# Patient Record
Sex: MALE | Race: WHITE | HISPANIC OR LATINO | URBAN - METROPOLITAN AREA
[De-identification: names, ages, dates, MRNs, and addresses within clinical notes are randomized per-mention and may not be internally consistent; named-entity substitution may affect disease eponyms.]

---

## 2023-02-17 ENCOUNTER — LAB OUTSIDE AN ENCOUNTER (OUTPATIENT)
Dept: URBAN - METROPOLITAN AREA CLINIC 60 | Facility: CLINIC | Age: 48
End: 2023-02-17

## 2023-02-17 ENCOUNTER — OFFICE VISIT (OUTPATIENT)
Dept: URBAN - METROPOLITAN AREA CLINIC 60 | Facility: CLINIC | Age: 48
End: 2023-02-17
Payer: COMMERCIAL

## 2023-02-17 ENCOUNTER — WEB ENCOUNTER (OUTPATIENT)
Dept: URBAN - METROPOLITAN AREA CLINIC 60 | Facility: CLINIC | Age: 48
End: 2023-02-17

## 2023-02-17 VITALS
HEART RATE: 90 BPM | WEIGHT: 234 LBS | SYSTOLIC BLOOD PRESSURE: 120 MMHG | RESPIRATION RATE: 20 BRPM | DIASTOLIC BLOOD PRESSURE: 70 MMHG | OXYGEN SATURATION: 98 % | BODY MASS INDEX: 35.46 KG/M2 | HEIGHT: 68 IN | TEMPERATURE: 98.5 F

## 2023-02-17 DIAGNOSIS — Z12.11 SCREENING FOR MALIGNANT NEOPLASM OF COLON: ICD-10-CM

## 2023-02-17 DIAGNOSIS — R12 HEARTBURN: ICD-10-CM

## 2023-02-17 PROCEDURE — 99204 OFFICE O/P NEW MOD 45 MIN: CPT | Performed by: PHYSICIAN ASSISTANT

## 2023-02-17 RX ORDER — LOSARTAN POTASSIUM 25 MG/1
TAKE ONE TABLET BY MOUTH ONE TIME DAILY MUST CALL DR. FOR APPOINTMENT TABLET, FILM COATED ORAL
Qty: 90 UNSPECIFIED | Refills: 0 | Status: ACTIVE | COMMUNITY

## 2023-02-17 NOTE — HPI-TODAY'S VISIT:
47-year-old [place]-speaking male with hypertension, hyperlipidemia, vitamin D deficiency is referred to the office for baseline screening colonoscopy.   He does speak English as well. Translation was provided by office staff when needed.   He has a long history of heartburn occurring  once a week if he eats greasy or spicy foods and would like EGD to be done along with his colonoscopy. He has no dysphagia, odynophagia. He uses omeprazole 20mg on a demand basis. He has no unintentional weight loss. No prior EGD.   He has no abdominal pain, nausea, vomiting, abdominal pain, constipation, diarrhea, melena, hematochezia.   He has no family history of colon cancer.

## 2023-03-01 ENCOUNTER — TELEPHONE ENCOUNTER (OUTPATIENT)
Dept: URBAN - METROPOLITAN AREA CLINIC 60 | Facility: CLINIC | Age: 48
End: 2023-03-01

## 2023-03-03 ENCOUNTER — OFFICE VISIT (OUTPATIENT)
Dept: URBAN - METROPOLITAN AREA SURGERY CENTER 4 | Facility: SURGERY CENTER | Age: 48
End: 2023-03-03

## 2023-03-03 ENCOUNTER — CLAIMS CREATED FROM THE CLAIM WINDOW (OUTPATIENT)
Dept: URBAN - METROPOLITAN AREA SURGERY CENTER 4 | Facility: SURGERY CENTER | Age: 48
End: 2023-03-03
Payer: COMMERCIAL

## 2023-03-03 ENCOUNTER — CLAIMS CREATED FROM THE CLAIM WINDOW (OUTPATIENT)
Dept: URBAN - METROPOLITAN AREA CLINIC 4 | Facility: CLINIC | Age: 48
End: 2023-03-03
Payer: COMMERCIAL

## 2023-03-03 DIAGNOSIS — Z12.11 SCREENING FOR MALIGNANT NEOPLASM OF COLON: ICD-10-CM

## 2023-03-03 DIAGNOSIS — D12.3 ADENOMATOUS POLYP OF TRANSVERSE COLON: ICD-10-CM

## 2023-03-03 DIAGNOSIS — D12.3 BENIGN NEOPLASM OF TRANSVERSE COLON: ICD-10-CM

## 2023-03-03 DIAGNOSIS — K57.30 DIVERTCULOSIS OF LG INT W/O PERFORATION OR ABSCESS W/O BLEEDING: ICD-10-CM

## 2023-03-03 DIAGNOSIS — D12.4 ADENOMATOUS POLYP OF DESCENDING COLON: ICD-10-CM

## 2023-03-03 PROCEDURE — 45385 COLONOSCOPY W/LESION REMOVAL: CPT | Performed by: INTERNAL MEDICINE

## 2023-03-03 PROCEDURE — 88305 TISSUE EXAM BY PATHOLOGIST: CPT | Performed by: PATHOLOGY

## 2023-03-03 RX ORDER — LOSARTAN POTASSIUM 25 MG/1
TAKE ONE TABLET BY MOUTH ONE TIME DAILY MUST CALL DR. FOR APPOINTMENT TABLET, FILM COATED ORAL
Qty: 90 UNSPECIFIED | Refills: 0 | Status: ACTIVE | COMMUNITY

## 2023-03-08 PROBLEM — 398050005 DIVERTICULAR DISEASE OF COLON: Status: ACTIVE | Noted: 2023-03-08

## 2023-03-15 ENCOUNTER — OFFICE VISIT (OUTPATIENT)
Dept: URBAN - METROPOLITAN AREA CLINIC 60 | Facility: CLINIC | Age: 48
End: 2023-03-15
Payer: COMMERCIAL

## 2023-03-15 ENCOUNTER — DASHBOARD ENCOUNTERS (OUTPATIENT)
Age: 48
End: 2023-03-15

## 2023-03-15 VITALS
HEIGHT: 68 IN | TEMPERATURE: 97.9 F | BODY MASS INDEX: 35.31 KG/M2 | OXYGEN SATURATION: 97 % | SYSTOLIC BLOOD PRESSURE: 120 MMHG | DIASTOLIC BLOOD PRESSURE: 80 MMHG | WEIGHT: 233 LBS | HEART RATE: 86 BPM

## 2023-03-15 DIAGNOSIS — K64.0 GRADE I HEMORRHOIDS: ICD-10-CM

## 2023-03-15 DIAGNOSIS — Z86.010 PERSONAL HISTORY OF COLONIC POLYPS: ICD-10-CM

## 2023-03-15 DIAGNOSIS — K57.90 DIVERTICULOSIS: ICD-10-CM

## 2023-03-15 PROBLEM — 397881000: Status: ACTIVE | Noted: 2023-03-15

## 2023-03-15 PROBLEM — 428283002: Status: ACTIVE | Noted: 2023-03-15

## 2023-03-15 PROCEDURE — 99212 OFFICE O/P EST SF 10 MIN: CPT | Performed by: PHYSICIAN ASSISTANT

## 2023-03-15 RX ORDER — LOSARTAN POTASSIUM 25 MG/1
TAKE ONE TABLET BY MOUTH ONE TIME DAILY MUST CALL DR. FOR APPOINTMENT TABLET, FILM COATED ORAL
Qty: 90 UNSPECIFIED | Refills: 0 | Status: ACTIVE | COMMUNITY

## 2023-03-15 NOTE — HPI-TODAY'S VISIT:
47-year-old  male with hypertension, hyperlipidemia, vitamin D deficiency was referred to the office last month to schedule baseline screening colonoscopy. He reported a long history of heartburn occurring about once a week when eating greasy or spicy foods.  He requested that EGD be done along with his colonoscopy.  He denied any dysphagia or odynophagia.  He uses omeprazole 20 mg daily on a demand basis.  He denied any unintentional weight loss.  He had no other GI complaints. He ultimately decided to undergo only colonoscopy.  This was done on March 3, 2023.  His colonoscopy demonstrated an 8 mm tubulovillous adenoma which was removed from the transverse colon.  3 small tubular adenomas were removed from the descending colon.  Additional findings included sigmoid diverticulosis and small internal hemorrhoids.  He follows up doing well. He had no problems following his procedure.  He has not had any GERD symptoms since his last visit. He has no GI complaints.   He has no family history of colon cancer.

## 2024-11-08 NOTE — PHYSICAL EXAM SKIN:
no rashes , no suspicious lesions , no areas of discoloration , no jaundice present , no masses , no tenderness on palpation
Detail Level: Zone
Render In Strict Bullet Format?: No
Initiate Treatment: Tretinoin 0.05 % topical cream apply with moisturizer